# Patient Record
Sex: MALE | Race: WHITE | NOT HISPANIC OR LATINO | URBAN - NONMETROPOLITAN AREA
[De-identification: names, ages, dates, MRNs, and addresses within clinical notes are randomized per-mention and may not be internally consistent; named-entity substitution may affect disease eponyms.]

---

## 2018-01-01 ENCOUNTER — APPOINTMENT (RX ONLY)
Dept: URBAN - NONMETROPOLITAN AREA CLINIC 1 | Facility: CLINIC | Age: 73
Setting detail: DERMATOLOGY
End: 2018-01-01

## 2018-01-01 DIAGNOSIS — D18.0 HEMANGIOMA: ICD-10-CM

## 2018-01-01 DIAGNOSIS — L82.1 OTHER SEBORRHEIC KERATOSIS: ICD-10-CM

## 2018-01-01 DIAGNOSIS — L81.4 OTHER MELANIN HYPERPIGMENTATION: ICD-10-CM

## 2018-01-01 DIAGNOSIS — L60.3 NAIL DYSTROPHY: ICD-10-CM

## 2018-01-01 DIAGNOSIS — B00.1 HERPESVIRAL VESICULAR DERMATITIS: ICD-10-CM

## 2018-01-01 DIAGNOSIS — D22 MELANOCYTIC NEVI: ICD-10-CM

## 2018-01-01 PROCEDURE — ? ADDITIONAL NOTES

## 2018-01-01 PROCEDURE — 99213 OFFICE O/P EST LOW 20 MIN: CPT

## 2018-01-01 PROCEDURE — ? COUNSELING

## 2018-01-01 ASSESSMENT — LOCATION ZONE DERM
LOCATION ZONE: TRUNK
LOCATION ZONE: TRUNK
LOCATION ZONE: TOENAIL

## 2018-01-01 ASSESSMENT — LOCATION DETAILED DESCRIPTION DERM
LOCATION DETAILED: RIGHT BUTTOCK
LOCATION DETAILED: LEFT 5TH TOENAIL
LOCATION DETAILED: LEFT MID-UPPER BACK
LOCATION DETAILED: RIGHT MEDIAL BUTTOCK
LOCATION DETAILED: LEFT MEDIAL UPPER BACK
LOCATION DETAILED: LEFT SUPERIOR UPPER BACK

## 2018-01-01 ASSESSMENT — LOCATION SIMPLE DESCRIPTION DERM
LOCATION SIMPLE: RIGHT BUTTOCK
LOCATION SIMPLE: LEFT UPPER BACK
LOCATION SIMPLE: LEFT 5TH TOE

## 2018-10-29 PROBLEM — L57.0 ACTINIC KERATOSIS: Status: ACTIVE | Noted: 2018-01-01

## 2018-10-29 PROBLEM — D22.5 MELANOCYTIC NEVI OF TRUNK: Status: ACTIVE | Noted: 2018-01-01

## 2018-10-29 PROBLEM — Z85.828 PERSONAL HISTORY OF OTHER MALIGNANT NEOPLASM OF SKIN: Status: ACTIVE | Noted: 2018-01-01

## 2018-10-29 PROBLEM — L60.3 NAIL DYSTROPHY: Status: ACTIVE | Noted: 2018-01-01

## 2018-10-29 PROBLEM — L82.1 OTHER SEBORRHEIC KERATOSIS: Status: ACTIVE | Noted: 2018-01-01

## 2018-10-29 PROBLEM — D18.01 HEMANGIOMA OF SKIN AND SUBCUTANEOUS TISSUE: Status: ACTIVE | Noted: 2018-01-01

## 2018-10-29 PROBLEM — B00.1 HERPESVIRAL VESICULAR DERMATITIS: Status: ACTIVE | Noted: 2018-01-01

## 2018-10-29 PROBLEM — L81.4 OTHER MELANIN HYPERPIGMENTATION: Status: ACTIVE | Noted: 2018-01-01

## 2018-10-29 NOTE — PROCEDURE: ADDITIONAL NOTES
Additional Notes: Patient continues on not only prednisones but multiple other medicines for chronic pulmonary disease. He is now on oxygen. He has proven mycosis of the toenails and his insurance did not want to pay for the most affective topical treatment. I’m concerned about treating him with oral agents since he’s on multiple other drugs. He should respond to one of the stronger topicals and I discussed with him getting a prescription from [facility] pharmacy. He understands that this would be a compound mixture but it might be more effective than what is insurance is requiring him to use.Additionally he has areas of either herpes Symplex to or herpes vera Tano zoster break out especially on the sacral area. Insurance has also denied him getting a cycle of ear topcoat therapy. He does take the oral pills but thinks that the topicals work better. I told him that when he has an active blister to come in to the office so we can do a culture. Otherwise we will also try the taco posthepatic morale Chantel preparation with a psycho beer. This also comes from [facility] and [place]. Follow up yearly yearly Additional Notes: Patient continues on not only prednisones but multiple other medicines for chronic pulmonary disease. He is now on oxygen. He has proven mycosis of the toenails and his insurance did not want to pay for the most affective topical treatment. I’m concerned about treating him with oral agents since he’s on multiple other drugs. He should respond to one of the stronger topicals and I discussed with him getting a prescription from [facility] pharmacy. He understands that this would be a compound mixture but it might be more effective than what is insurance is requiring him to use.Additionally he has areas of either herpes Symplex to or herpes vera Tano zoster break out especially on the sacral area. Insurance has also denied him getting a cycle of ear topcoat therapy. He does take the oral pills but thinks that the topicals work better. I told him that when he has an active blister to come in to the office so we can do a culture. Otherwise we will also try the taco posthepatic morale Chantel preparation with a psycho beer. This also comes from [facility] and [place]. Follow up yearly yearly